# Patient Record
Sex: FEMALE | Race: WHITE
[De-identification: names, ages, dates, MRNs, and addresses within clinical notes are randomized per-mention and may not be internally consistent; named-entity substitution may affect disease eponyms.]

---

## 2017-01-11 ENCOUNTER — HOSPITAL ENCOUNTER (OUTPATIENT)
Dept: HOSPITAL 53 - M SFHCADAM | Age: 52
End: 2017-01-11
Attending: PHYSICIAN ASSISTANT
Payer: COMMERCIAL

## 2017-01-11 DIAGNOSIS — G89.29: ICD-10-CM

## 2017-01-11 DIAGNOSIS — E55.9: ICD-10-CM

## 2017-01-11 DIAGNOSIS — F17.210: Primary | ICD-10-CM

## 2017-01-11 LAB
ALBUMIN SERPL BCG-MCNC: 4.7 GM/DL (ref 3.2–5.2)
ALBUMIN/GLOB SERPL: 1.68 {RATIO} (ref 1–1.93)
ALP SERPL-CCNC: 99 U/L (ref 45–117)
ALT SERPL W P-5'-P-CCNC: 21 U/L (ref 12–78)
ANION GAP SERPL CALC-SCNC: 7 MEQ/L (ref 8–16)
AST SERPL-CCNC: 18 U/L (ref 15–37)
BASOPHILS # BLD AUTO: 0.1 K/MM3 (ref 0–0.2)
BASOPHILS NFR BLD AUTO: 1 % (ref 0–1)
BILIRUB SERPL-MCNC: 0.3 MG/DL (ref 0.2–1)
BUN SERPL-MCNC: 10 MG/DL (ref 7–18)
CALCIUM SERPL-MCNC: 9.1 MG/DL (ref 8.5–10.1)
CHLORIDE SERPL-SCNC: 101 MEQ/L (ref 98–107)
CHOLEST SERPL-MCNC: 286 MG/DL (ref ?–200)
CO2 SERPL-SCNC: 33 MEQ/L (ref 21–32)
CREAT SERPL-MCNC: 0.84 MG/DL (ref 0.55–1.02)
EOSINOPHIL # BLD AUTO: 0.5 K/MM3 (ref 0–0.5)
EOSINOPHIL NFR BLD AUTO: 4.6 % (ref 0–3)
ERYTHROCYTE [DISTWIDTH] IN BLOOD BY AUTOMATED COUNT: 12.5 % (ref 11.5–14.5)
EST. AVERAGE GLUCOSE BLD GHB EST-MCNC: 120 MG/DL (ref 60–110)
GFR SERPL CREATININE-BSD FRML MDRD: > 60 ML/MIN/{1.73_M2} (ref 51–?)
GLUCOSE SERPL-MCNC: 118 MG/DL (ref 70–105)
LARGE UNSTAINED CELL #: 0.3 K/MM3 (ref 0–0.4)
LARGE UNSTAINED CELL %: 3.2 % (ref 0–4)
LYMPHOCYTES # BLD AUTO: 5.4 K/MM3 (ref 1.5–4.5)
LYMPHOCYTES NFR BLD AUTO: 52.4 % (ref 24–44)
MCH RBC QN AUTO: 30.2 PG (ref 27–33)
MCHC RBC AUTO-ENTMCNC: 33.3 G/DL (ref 32–36.5)
MCV RBC AUTO: 90.6 FL (ref 80–96)
MONOCYTES # BLD AUTO: 0.6 K/MM3 (ref 0–0.8)
MONOCYTES NFR BLD AUTO: 5.8 % (ref 0–5)
NEUTROPHILS # BLD AUTO: 3.4 K/MM3 (ref 1.8–7.7)
NEUTROPHILS NFR BLD AUTO: 33 % (ref 36–66)
PLATELET # BLD AUTO: 271 K/MM3 (ref 150–450)
POTASSIUM SERPL-SCNC: 4 MEQ/L (ref 3.5–5.1)
PROT SERPL-MCNC: 7.5 GM/DL (ref 6.4–8.2)
SODIUM SERPL-SCNC: 141 MEQ/L (ref 136–145)
TRIGL SERPL-MCNC: 275 MG/DL (ref ?–150)
WBC # BLD AUTO: 10.3 K/MM3 (ref 4–10)

## 2017-01-12 ENCOUNTER — HOSPITAL ENCOUNTER (OUTPATIENT)
Dept: HOSPITAL 53 - M SFHCADAM | Age: 52
End: 2017-01-12
Attending: PHYSICIAN ASSISTANT
Payer: COMMERCIAL

## 2017-01-12 DIAGNOSIS — G89.29: Primary | ICD-10-CM

## 2017-01-12 DIAGNOSIS — R79.89: ICD-10-CM

## 2017-01-12 LAB — T4 FREE SERPL-MCNC: 1.22 NG/DL (ref 0.76–1.46)

## 2017-01-23 ENCOUNTER — HOSPITAL ENCOUNTER (OUTPATIENT)
Dept: HOSPITAL 53 - M ADAMS | Age: 52
End: 2017-01-23
Attending: PHYSICIAN ASSISTANT
Payer: COMMERCIAL

## 2017-01-23 DIAGNOSIS — M54.5: ICD-10-CM

## 2017-01-23 DIAGNOSIS — M54.2: Primary | ICD-10-CM

## 2017-01-23 DIAGNOSIS — G89.29: ICD-10-CM

## 2017-01-24 NOTE — REP
Clinical:  Cervicalgia a

 

Technique:  AP, lateral, flexion/extension, bilateral oblique, and open-mouth

views.

 

Findings:

Alignment and lordosis maintained.  No acute fracture / compression injury or

subluxation.  Minimal age-related changes include subtle anterior spurring at the

C6-7 and to a lesser extent the C5-6 levels as well as very minimal disc space

narrowing at the C7-T1 level.  Spinous processes are intact and normal.

Prevertebral soft tissues are normal.  Oblique views demonstrate patent neural

foramen.  Open mouth view demonstrates normal C1-C2 articulation and odontoid

process.

 

Impression:

Mild age-related degenerative changes.

 

 

Signed by

Yousuf King MD 01/24/2017 04:59 A

## 2017-01-24 NOTE — REP
Clinical:  Pain .

 

Technique:  AP, lateral views of the left knee.

 

Findings:  The osseous structures and joint spaces are intact and normal.  There

is no evidence for acute fracture or dislocation.  No joint effusion is

appreciated.  Surrounding soft tissues are unremarkable.  No subcutaneous

emphysema or radiodense foreign body.  No overt arthritic degenerative changes

appreciated.

 

Impression:

Normal age-appropriate left knee radiographs.

 

 

Signed by

Yousuf King MD 01/24/2017 05:01 A

## 2017-01-24 NOTE — REP
Clinical:  Lower back pain.

 

Technique:  AP, lateral, bilateral oblique and coned-down views of the

lumbosacral spine.

 

Findings:

Alignment and lordosis maintained.  No acute fracture / compression injury or

subluxation.  Mild osteopenia is suggested along with mild multilevel

degenerative changes including disc space narrowing at the L5-S1 level as well as

lower thoracic and T12-L1 levels with minimal anterior spurring.  No

spondylolysis or spondylolisthesis appreciated.

 

Impression:

Mild osteopenia and mild degenerative changes as detailed above.

 

 

Signed by

Yousuf King MD 01/24/2017 04:57 A

## 2017-02-20 ENCOUNTER — HOSPITAL ENCOUNTER (OUTPATIENT)
Dept: HOSPITAL 53 - M PAIN | Age: 52
End: 2017-02-20
Attending: NURSE PRACTITIONER
Payer: COMMERCIAL

## 2017-02-20 DIAGNOSIS — M54.2: ICD-10-CM

## 2017-02-20 DIAGNOSIS — F17.210: ICD-10-CM

## 2017-02-20 DIAGNOSIS — M54.5: ICD-10-CM

## 2017-02-20 DIAGNOSIS — Z79.891: ICD-10-CM

## 2017-02-20 DIAGNOSIS — G43.909: ICD-10-CM

## 2017-02-20 DIAGNOSIS — M15.0: ICD-10-CM

## 2017-02-20 DIAGNOSIS — M25.511: ICD-10-CM

## 2017-02-20 DIAGNOSIS — Z79.899: ICD-10-CM

## 2017-02-20 DIAGNOSIS — G89.29: ICD-10-CM

## 2017-02-20 DIAGNOSIS — M51.26: ICD-10-CM

## 2017-02-20 DIAGNOSIS — R07.89: ICD-10-CM

## 2017-02-20 DIAGNOSIS — Z79.1: ICD-10-CM

## 2017-02-20 DIAGNOSIS — Z86.73: ICD-10-CM

## 2017-02-20 DIAGNOSIS — M25.562: ICD-10-CM

## 2017-02-20 DIAGNOSIS — Z86.718: ICD-10-CM

## 2017-02-20 DIAGNOSIS — E78.5: ICD-10-CM

## 2017-02-20 DIAGNOSIS — Z09: Primary | ICD-10-CM

## 2017-02-20 DIAGNOSIS — H18.50: ICD-10-CM

## 2017-02-24 NOTE — ECWPNPC
PATIENT NAME: GE WATKINS

: 1965

GENDER: FEMALE

MRN: [id number]

VISIT DATE: 2017

DISCHARGE DATE: 17 1132

VISIT LOCKED DATE TIME: 

PHYSICIAN: MARCELINO BAH 

RESOURCE: MARCELINO BAH 

 

           

           

REASON FOR APPOINTMENT

           

          1. CHRONIC PAIN RECONSULT

           

HISTORY OF PRESENT ILLNESS

           

      FALL RISK SCREENING:

      SCREENING

           

           

          :NO FALLS IN THE PAST YEAR

           

      PAIN SCREENING:

      PATIENT HAS A COMPLAINT OF ACUTE OR CHRONIC PAIN

           

           

          YES

           

      TODAY'S VISIT:

      NOTES:

          HAD BEEN SEEING PCP WHOM HAD BEEN PRESCRIBING HER PAIN MEDS -VWAS

          DECREASED IN DOSAGE AND WAS PUT ON WEEKLY MEDS..REPORTS PAIN AS

          7/10. NOTES PAIN IS CENTERED OVER RIGHT SHOULDER, LOW BACK AND

          LEFT KNEE. LEVEL OF PAIN DEPENDS ON ACTIVITY. STATES SHE IS

          HAVING HEART PROBLEMS - HAS BEEN TO THE ER 3 TIMES. USES NTG,

          BABY ASPIRIN TO TREAT CHEST PAIN BUT HAS NOT YET SEEN A

          CARDIOLOGIST. RATES PAIN LEVEL TODAY AS 7/10. DESCRIBES IT AS

          CONSTANT ACHING SHARP STABBING AND SORE..

           

CURRENT MEDICATIONS

           

          TAKING NAPROXEN 500 MG TABLET 1 TABLET AS NEEDED ORALLY EVERY 12

          HRS

          TAKING VITAMIN D (ERGOCALCIFEROL) 43171 UNIT CAPSULE 1 CAPSULE

          ORALLY ONCE A WEEK

          TAKING CYCLOBENZAPRINE HCL 5 MG TABLET 1 TABLET ORALLY THREE

          TIMES A DAY AS NEEDED

          TAKING OXYCODONE-ACETAMINOPHEN 7.5-325 MG TABLET 1 TABLET AS

          NEEDED ORALLY EVERY 8 HRS, MDD 3

          NOT-TAKING FISH OIL 1000 MG CAPSULE 2 CAPSULE ORALLY TWICE DAILY

          MEDICATION LIST REVIEWED AND RECONCILED WITH THE PATIENT

           

PAST MEDICAL HISTORY

           

          CHRONIC NECK AND LOW BACK PAIN REQUIRING LONG TERM OPIATE

          MEDICATIONS - PREVIOUSLY FOLLOWED BY [facility] PAIN CLINIC - NO

          CONCERNS, DUE TO APPOINTMENT TIME CONFLICTS MED MANAGEMENT WAS

          DEFERRED BACK TO PCP

          HYPERLIPIDEMIA

          CORNEAL DYSTROPHY - HAS ROUTINE EYE EXAMS WITH CENTER FOR SIGHT

          OSTEOARTHRITS

          MIGRAINES

          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

          UNSTABLE LEFT KNEE

          TORN ROTATOR CUFF RIGHT

          CHEST PAIN AND PRESSURE

           

ALLERGIES

           

          N.K.D.A.

           

SURGICAL HISTORY

           

          EXPLORATORY LAP

          C SECTION X 2

          CHOLECYSTECTOMY DR. BOOKERH - [facility] 14

          COLPOSCOPY

           

FAMILY HISTORY

           

          FATHER: ALIVE

          MOTHER:  67 YRS

          MATERNAL GRAND FATHER: 

          3DAUGHTER(S) .

          BROTHER  OF [place] BUT NOT H[place] LYMP.3 DAUGHTERS -1 HAD BONE

          [place] , NON HODGKINS LYMPHOBLASTIC LYMPHOMA B CELL IN THE BONES, HAS

          ANOTHER CANCER BUT NO DIAGNOSIS AS OF YET. ONE TWIN IS HAVING

          SAME BONE ISSUES AND THE DAUGHTER WITH [place] AND WILL BE HAVING A

          WORK UP THURS.

           

SOCIAL HISTORY

           

          GENERAL:

           

          TOBACCO USE

          ARE YOU A:CURRENT SMOKER

          HOW MANY CIGARETTES A DAY DO YOU SMOKE?5 OR LESS

          HOW SOON AFTER YOU WAKE UP DO YOU SMOKE YOUR FIRST

          CIGARETTE?31-60 MIN

          HOW OFTEN DO YOU SMOKE CIGARETTES?SOME DAYS, BUT NOT EVERY DAY

          PATIENT COUNSELED ON THE DANGERS OF TOBACCO USE AND URGED TO

          QUIT:2017

          ARE YOU INTERESTED IN QUITTING?THINKING ABOUT QUITTING

          COUNSELED THE PATIENT ON SMOKING CESSATION, EDUCATION

          [id number]/20/2017

          ADDITIONAL FINDINGS: TOBACCO USERCHEWS FINE CUT TOBACCO DENIES

          EVERY DOING THIS.

          SMOKING CESSATION INFORMATION [id number]

           

           

          ALCOHOL SCREENING POINTS: 1, INTERPRETATION: NEGATIVE.

           

           

          RECREATIONAL DRUG USE

          DRUG USE?NO

           

           

          CAFFEINE

          CAFFEINE USE?YES

          HOW OFTEN AND HOW MUCH? 5-6 CUPS COFFEE DAILY

           

           

          SEXUAL HX HAD SEX IN THE LAST 12 MONTHS (VAGINAL, ORAL, OR

          ANAL)?: YES, WITH: MEN ONLY, USE PROTECTION?: NO, HAVE YOU EVER

          HAD AN STD?: NO, LMP:: 2014.

           

           

          OCCUPATION: IN HOUSE .

           

           

          DIET: LOW FAT, LOW CHOLESTEROL.

           

           

          EXERCISE: NO REGULAR EXERCISE - STAYS ACTIVE.

           

           

          Bahai: NO Spiritism BELIEFS THAT WOULD IMPACT HEALTH CARE.

           

           

          LANGUAGE: ENGLISH.

           

           

          EDUCATION: COLLEGE. PLAN OF CARE FOR THE PAIN CLINIC REVIEWED

          WITH PATIENT AND SHE VERBALIZED UNDERSTANDING.

           

           

          LEARNING BARRIERS / SPECIAL NEEDS

          CHANGE FROM LAST VISIT?NO

          BARRIERS TO LEARNING?NO

          HEARING IMPAIRED?NO

          VISION IMPAIRED?NO

          COGNITIVELY IMPAIRED?NO

          READINESS TO LEARN?YES

          LEARNING PREFERENCES?NO

          LEARNING CAPABILITIES PRESENT?YES

          EMOTIONAL BARRIERS?NO

           

           

          PAIN CLINIC PFS, CLERGY, PUBLIC HEALTH REFERRALS

          PFS REFERRAL NEEDED?NO

          CLERGY REFERRAL NEEDED?NO

          PUBLIC HEALTH REFERRAL NEEDED?NO

           

           

          ADVANCED DIRECTIVES

          HEALTH CARE PROXY?NO

          POWER OF ?NO

           

           

          : YES.

           

           

          DOMESTIC VIOLENCE: NONE.

           

HOSPITALIZATION/MAJOR DIAGNOSTIC PROCEDURE

           

          SURGERY RELATED ONLY

           

REVIEW OF SYSTEMS

           

      CONSTITUTIONAL:

           

          ANY CHANGE IN YOUR MEDICAL CONDITION? YES CHEST PAIN AND PRESSURE

          THAT SHE IS BEING WORKED UP FOR . CHILLS NO . FEVER NO .

           

      INFECTION:

           

          DO YOU HAVE NEW INFECTIONS? NO . DO YOU HAVE HISTORY OF MRSA? NO

          .

           

      MUSCULOSKELETAL:

           

          ANY NEW PATTERNS OF PAIN OR NUMBNESS? YES PAIN HAS INCREASED IN

          INTENSITY AND SHE IS UNDER A HUGE AMOUNT OF STRESS AT THIS TIME .

          SYTEMIC LUPUS NO .

           

      GASTROENTEROLOGY:

           

          ANY NEW CHANGE IN BOWEL CONTROL? NO . BARRETTS ESOPHAGUS NO .

          CIRRHOSIS NO . HEPATITIS NO . LIVER FAILURE NO . ACID REFLUX NO .

          UNEXPLAINED WEIGHT LOSS NO .

           

      GENITOURINARY:

           

          ANY NEW CHANGE IN BLADDER CONTROL? NO . IS THERE A CHANCE YOU

          COULD BE PREGNANT? NO .

           

      HEMATOLOGY/LYMPH:

           

          DO YOU TAKE ANY BLOOD THINNERS? (FOR EXAMPLE- COUMADIN, PLAVIX,

          AGGRENOX, PLATEL, PRADAXA, OR XARELTO) NO . WHEN WAS YOUR LAST

          DOSE? DATE: TIME: . LOW PLATELET COUNT NO . SICKLE CELL DISEASE

          NO . VON WILLIEBRANDS NO . FACTOR V LEIDEN NO . THALLASEMIA NO .

          ANEMIA NO . EASY BRUISING NO .

           

      NEUROLOGY:

           

          HAVE YOU FALLEN IN THE PAST 6 MONTHS? YES, SLIPPED ON ICE--NO

          FRACTURES . ANY NEW EXTREMITY NUMBNESS OR WEAKNESS? NO . HEAD

          INJURY NO . DEMENTIA NO . CEREBRAL PALSY NO . MULTIPLE SCLEROSIS

          NO . DIZZINESS NO . HEADACHE ADMITS, HISTORY IF MIGRAINES .

          STROKES NO . VERTIGO NO .

           

      CARDIOLOGY:

           

          DO YOU HAVE A PACEMAKER OR DEFIBRILLATOR? NO . ANGINA NO . HEART

          ATTACK NO . HEART SURGERY NO . CONGESTIVE HEART FAILURE/FLUID

          OVERLOAD NO . CHEST PAIN PATIENT ADMITS, LEFT-SIDED,

          MIDSTERNALPRESSURE-LIKE SENSATION, , RADIATING TO LEFT ARM,

          RANDOM, RELIEVED WITH SUBLINGUAL NTG, SHARP. BEING WORKED UP AT

          [facility] in [place]. SCHEDULED FOR AN ECHO ON THURS. . HIGH BLOOD

          PRESSURE NO . IRREGULAR HEART BEAT NO .

           

      RESPIRATORY:

           

          HAVE YOU BEEN SICK IN THE PAST WEEK? NO . FEVER NO . FLU LIKE

          SYMPTOMS? NO . CPAP NO . BYPAP NO . ASTHMA NO . EMPHYSEMA NO .

          CHRONIC LUNG DISEASES NO . SHORTNESS OF BREATH ON EXERTION NO .

          COUGH NO . SNORING NO .

           

      INTEGUMENTARY:

           

          DO YOU HAVE ANY RASHES OR OPEN SORES? NO .

           

      ALLERGIC/IMMUNO:

           

          ARE YOU ALLERGIC TO SHELLFISH OR IV DYE? NO . ANY NEW ALLERGIES?

          NO .

           

      PSYCHIATRIC:

           

          DO YOU HAVE THOUGHTS OF HURTING YOURSELF OR SOMEONE ELSE? NO .

          ARE YOU ABUSED, NEGLECTED, OR IN AN UNSAFE ENVIRONMENT? NO .

           

      ENDOCRINOLOGY:

           

          ARE YOU DIABETIC? NO . THYROID DISORDER NO .

           

      OTHER:

           

          DO YOU NEED ANY PRESCRIPTIONS? YES OXYCODONE / ACETOMIN.,

          CYCLOBENZAPRINE . IF YES, PLEASE LIST: ____ . ANY NEW PROBLEMS

          WITH YOUR MEDICATIONS? NO . WHEN DID YOU LAST EAT? ____ . WHEN

          DID YOU LAST DRINK? ____ . WHAT DID YOU LAST DRINK? ____ . NAME

          OF PERSON DRIVING YOU HOME? ____ . DO YOU HAVE ANY OTHER

          QUESTIONS OR CONCERNS YES, WOULD LIKE TO DISCUSS MEDICTION AND

          PAIN RELIEF .

           

      REVIEWED BY:

           

          PROVIDER: MARCELINO MART .

           

VITAL SIGNS

           

          .8 LBS, HT 64 IN, BMI 20.22 INDEX, /69 MM HG, HR 87

          /MIN, RR 16 /MIN, TEMP 98.4 F, OXYGEN SAT % 96%, NA INITIALS SC

          15:33, REVIEWED BY: AD.

           

EXAMINATION

           

      GENERAL EXAMINATION:

          PSYCHALERT , ORIENTED X 3 , ANXIOUS, GOOD EYE CONTACT.

           

          HEENT:NORMOCEPHALIC. NO LYMPHADENOPATHY. NO THYROMEGALY..

           

          LUNGS:CLEAR TO AUSCULTATION BILATERALLY. NO WHEEZES NO RALES NO

          RHONCHI.

           

          HEART:HEART RATE REGULAR, RAPID. NO CARDIAC BRUITS. NO MURMUR

          HEARD TODAY..

           

          MUSCULOSKELETAL:TENDER WITH PALPATION OVER LUMBOSACRAL AXIS.

          POINTS AND TIGHT FIBROUS BANDS ARE IDENTIFIED OVER LUMBAR

          PARAVERTEBRAL MUSCLES. RISES EASILY TO A STANDING POSITION.

          POSTURE UPRIGHT. GAIT IS WIDE-BASED VERY SLIGHT RIGHT LEG LIMP.

          POINT TENDERNESS OVER ANTERIOR AND POSTERIOR AC JOINT. RIGHT

          SIDE. ABLE TO ABDUCT RIGHT SHOULDER TO 90  . PAIN IS NOTED WITH

          INTERNAL AND EXTERNAL ROTATION. EQUAL AND STRONG. NO FOCAL

          WEAKNESS IDENTIFIED IN THE UPPER OR LOWER EXTREMITIES..

           

          JOINTS:LEFT KNEE TENDERNESS TO PALPATION. NO SIGNIFICANT EDEMA.

          NO WARMTH NOTED ON PALPATION.

           

          NEUROLOGIC EXAM:EOMS ARE INTACT WITHOUT NYSTAGMUS. CRANIAL NERVES

          II THROUGH XII ARE GROSSLY INTACT. SPEECH IS NON-DYSARTHRIC.

          RAPID REPEATING ARM MOVEMENTS NON-DYSMETRIC. NO FOCAL SENSORY

          DEFICIT..

           

ASSESSMENTS

           

          PRIMARY OSTEOARTHRITIS INVOLVING MULTIPLE JOINTS - M15.0

          (PRIMARY)

           

          OTHER CHRONIC PAIN - G89.29

           

          PAIN IN RIGHT SHOULDER - M25.511

           

          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

           

          CHRONIC PRESCRIPTION OPIATE USE - Z79.891

           

TREATMENT

           

      PRIMARY OSTEOARTHRITIS INVOLVING MULTIPLE JOINTS

          REFILL OXYCODONE-ACETAMINOPHEN TABLET, 7.5-325 MG, 1 TABLET AS

          NEEDED, ORALLY, EVERY 8 HRS, MDD 3, 15 DAYS, 45, REFILLS 0

          [facility] MRI SPINE, L.S. WITH [id number],SUSAN M 2017

          4:41:59 PM > INCREASED BACK PAIN

          NOTES: NARCOTIC AGREEMENT TODAY. UTOX TODAY.

          CLINICAL NOTES: ISTOP REGISTRY REVIEWED AND DEMNOSTRATES

          COMPLLIANCE. BRINGS IN MEDICATIONS WHICH IS APPROPRIATE FOR WHAT

          WAS DISPENSED. I DID ASK GE JUNG THE REASON FOR HER

          MEDICATION CHANGE AND BEING PUT ON A WEEKLY REFILL. SHE STATES

          THAT THERE WAS A PERSONALITY DIFFERENCE. I WILL FURTHER EXPLORE

          THIS AND I WILL PLAN TO SEE HER BACK ON WITHIN THE 2 WEEK PERIOD

          TO DISCUSS FURTHER OPTIONS.

           

PROCEDURE CODES

           

           ESTABILISHED PATIENT [facility] CHARGE

           

DISPOSITION & COMMUNICATION

           

FOLLOW UP

           

          2 WEEKS (REASON: SAUL TO GET IMAGING ON RIGHT SHOULDER AT [facility] -

          CHECK AUTH FOR MRI LUMBAR SPINESRACUSE)

           

 

ELECTRONICALLY SIGNED BY LAUREN HENNESSY ON

          2017 AT 11:37 AM EST

           

           

           

 

DISCLAIMER :

THIS IS A VISIT SUMMARY EXTRACTED FROM THE [employer] CHART.

IT IS NOT A COPY OF THE [employer] PROGRESS NOTE.

BAMBI

## 2017-03-23 ENCOUNTER — HOSPITAL ENCOUNTER (OUTPATIENT)
Dept: HOSPITAL 53 - M PAIN | Age: 52
End: 2017-03-23
Attending: NURSE PRACTITIONER
Payer: COMMERCIAL

## 2017-03-23 DIAGNOSIS — M15.0: ICD-10-CM

## 2017-03-23 DIAGNOSIS — E78.5: ICD-10-CM

## 2017-03-23 DIAGNOSIS — G89.29: ICD-10-CM

## 2017-03-23 DIAGNOSIS — Z79.891: ICD-10-CM

## 2017-03-23 DIAGNOSIS — M54.2: ICD-10-CM

## 2017-03-23 DIAGNOSIS — E55.9: ICD-10-CM

## 2017-03-23 DIAGNOSIS — F17.210: ICD-10-CM

## 2017-03-23 DIAGNOSIS — Z79.899: ICD-10-CM

## 2017-03-23 DIAGNOSIS — M54.5: Primary | ICD-10-CM

## 2017-03-24 NOTE — ECWPNPC
PATIENT NAME: GE WATKINS

: 1965

GENDER: FEMALE

MRN: [id number]

VISIT DATE: 2017

DISCHARGE DATE: 17 1615

VISIT LOCKED DATE TIME: 

PHYSICIAN: MARCELINO BAH 

RESOURCE: MARCELINO BAH 

 

           

           

REASON FOR APPOINTMENT

           

          1. BACK/SHOULDER

           

HISTORY OF PRESENT ILLNESS

           

      HISTORY OF PRESENT ILLNESS:

      PAIN

           

           

          THE PATIENT DESCRIBES THE PAIN...

           

      FALL RISK SCREENING:

      SCREENING

           

           

          :NO FALLS IN THE PAST YEAR

           

      TODAY'S VISIT:

      NOTES:

          WAS SCHEDULED TO ETURN IN 2 WEEKS AFTER HER LAST VISIT. WAS NOT

          ABLE TO KEEP THIS APPOINTMENT DUE TO HER LIFE SCHEDULE. HAS HAD

          NOOXYCODONE SINCE THIS. REPORTS SHE HAS BEEN USING NATURAL

          REMEDIES AND HER HORSE MEDICINES AND STATE HER PAIN IS MANAGED.

          REPORTS SHE IS HAVING GI DISCOMFORT FROM AN ANTI INFLAMMATORY.

          REPORTS SHE IS VERY UPSET WITH HAVING HAD HER MEDS DISRUPTED AS

          THIS WAS "JUST PERFECT"..

           

CURRENT MEDICATIONS

           

          TAKING NAPROXEN 500 MG TABLET 1 TABLET AS NEEDED ORALLY EVERY 12

          HRS

          TAKING VITAMIN D (ERGOCALCIFEROL) 44261 UNIT CAPSULE 1 CAPSULE

          ORALLY ONCE A WEEK

          TAKING CYCLOBENZAPRINE HCL 5 MG TABLET 1 TABLET ORALLY THREE

          TIMES A DAY AS NEEDED

          TAKING OXYCODONE-ACETAMINOPHEN 7.5-325 MG TABLET 1 TABLET AS

          NEEDED ORALLY EVERY 8 HRS, MDD 3

          NOT-TAKING FISH OIL 1000 MG CAPSULE 2 CAPSULE ORALLY TWICE DAILY

          MEDICATION LIST REVIEWED AND RECONCILED WITH THE PATIENT

           

PAST MEDICAL HISTORY

           

          CHRONIC NECK AND LOW BACK PAIN REQUIRING LONG TERM OPIATE

          MEDICATIONS - PREVIOUSLY FOLLOWED BY [facility] PAIN CLINIC - NO

          CONCERNS, DUE TO APPOINTMENT TIME CONFLICTS MED MANAGEMENT WAS

          DEFERRED BACK TO PCP

          HYPERLIPIDEMIA

          CORNEAL DYSTROPHY - HAS ROUTINE EYE EXAMS WITH [facility] S[facility]

          OSTEOARTHRITS

          MIGRAINES

          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

          UNSTABLE LEFT KNEE

          TORN ROTATOR CUFF RIGHT

          CHEST PAIN AND PRESSURE

           

ALLERGIES

           

          N.K.D.A.

           

SOCIAL HISTORY

           

          GENERAL:

           

          TOBACCO USE

          ARE YOU A:CURRENT SMOKER

          HOW MANY CIGARETTES A DAY DO YOU SMOKE?11-20

          HOW SOON AFTER YOU WAKE UP DO YOU SMOKE YOUR FIRST

          CIGARETTE?WITHIN 5 MIN

          HOW OFTEN DO YOU SMOKE CIGARETTES?EVERY DAY

          PATIENT COUNSELED ON THE DANGERS OF TOBACCO USE AND URGED TO

          QUIT:2017

          ARE YOU INTERESTED IN QUITTING?NOT READY TO QUIT

          COUNSELED THE PATIENT ON SMOKING EFFECTS, EDUCATION

          [id number]/23/2017

          ADDITIONAL FINDINGS: TOBACCO USERCHEWS FINE CUT TOBACCO DENIES

          EVERY DOING THIS.

          SMOKING CESSATION INFORMATION [id number]

           

           

          ALCOHOL SCREENING POINTS: 1, INTERPRETATION: NEGATIVE.

           

           

          RECREATIONAL DRUG USE

          DRUG USE?NO

           

           

          CAFFEINE

          CAFFEINE USE?YES

          HOW OFTEN AND HOW MUCH? 5-6 CUPS COFFEE DAILY

           

           

          SEXUAL HX HAD SEX IN THE LAST 12 MONTHS (VAGINAL, ORAL, OR

          ANAL)?: YES, WITH: MEN ONLY, USE PROTECTION?: NO, HAVE YOU EVER

          HAD AN STD?: NO, LMP:: 2014.

           

           

          OCCUPATION: IN HOUSE .

           

           

          DIET: LOW FAT, LOW CHOLESTEROL.

           

           

          EXERCISE: NO REGULAR EXERCISE - STAYS ACTIVE.

           

           

          Quaker: NO Pentecostalism BELIEFS THAT WOULD IMPACT HEALTH CARE.

           

           

          LANGUAGE: ENGLISH.

           

           

          EDUCATION: COLLEGE. PLAN OF CARE FOR THE PAIN CLINIC REVIEWED

          WITH PATIENT AND SHE VERBALIZED UNDERSTANDING.

           

           

          LEARNING BARRIERS / SPECIAL NEEDS

          CHANGE FROM LAST VISIT?NO

          BARRIERS TO LEARNING?NO

          HEARING IMPAIRED?NO

          VISION IMPAIRED?NO

          COGNITIVELY IMPAIRED?NO

          READINESS TO LEARN?YES

          LEARNING PREFERENCES?NO

          LEARNING CAPABILITIES PRESENT?YES

          EMOTIONAL BARRIERS?NO

           

           

          PAIN CLINIC PFS, CLERGY, PUBLIC HEALTH REFERRALS

          PFS REFERRAL NEEDED?NO

          CLERGY REFERRAL NEEDED?NO

          PUBLIC HEALTH REFERRAL NEEDED?NO

           

           

          ADVANCED DIRECTIVES

          HEALTH CARE PROXY?NO

          POWER OF ?NO

           

           

          : YES.

           

           

          DOMESTIC VIOLENCE: NONE.

           

REVIEW OF SYSTEMS

           

      CONSTITUTIONAL:

           

          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 . ANY CHANGE IN YOUR MEDICAL CONDITION? NO

          . CHILLS NO . FEVER NO .

           

      INFECTION:

           

          DO YOU HAVE NEW INFECTIONS? NO . DO YOU HAVE HISTORY OF MRSA? NO

          .

           

      MUSCULOSKELETAL:

           

          ANY NEW PATTERNS OF PAIN OR NUMBNESS? NO .

           

      GASTROENTEROLOGY:

           

          GENERAL DENIES HEMATEMESIS OR HEMATACHEZIA . ANY NEW CHANGE IN

          BOWEL CONTROL? NO .

           

      GENITOURINARY:

           

          ANY NEW CHANGE IN BLADDER CONTROL? NO . IS THERE A CHANCE YOU

          COULD BE PREGNANT? NO .

           

      HEMATOLOGY/LYMPH:

           

          DO YOU TAKE ANY BLOOD THINNERS? (FOR EXAMPLE- COUMADIN, PLAVIX,

          AGGRENOX, PLATEL, PRADAXA, OR XARELTO) NO . WHEN WAS YOUR LAST

          DOSE? DATE: TIME: .

           

      NEUROLOGY:

           

          HAVE YOU FALLEN IN THE PAST 6 MONTHS? YES, OVER THE WINTER HER

          LEFT KNEE GAVE OUT. NO INJURY . ANY NEW EXTREMITY NUMBNESS OR

          WEAKNESS? NO .

           

      CARDIOLOGY:

           

          DO YOU HAVE A PACEMAKER OR DEFIBRILLATOR? NO .

           

      RESPIRATORY:

           

          HAVE YOU BEEN SICK IN THE PAST WEEK? NO . FEVER NO . FLU LIKE

          SYMPTOMS? NO . COUGH NO .

           

      INTEGUMENTARY:

           

          DO YOU HAVE ANY RASHES OR OPEN SORES? NO .

           

      ALLERGIC/IMMUNO:

           

          ARE YOU ALLERGIC TO SHELLFISH OR IV DYE? NO . ANY NEW ALLERGIES?

          NO .

           

      PSYCHIATRIC:

           

          DO YOU HAVE THOUGHTS OF HURTING YOURSELF OR SOMEONE ELSE? NO .

          ARE YOU ABUSED, NEGLECTED, OR IN AN UNSAFE ENVIRONMENT? NO .

           

      ENDOCRINOLOGY:

           

          ARE YOU DIABETIC? NO .

           

      OTHER:

           

          DO YOU NEED ANY PRESCRIPTIONS? YES . IF YES, PLEASE LIST:

          ____PERCOCET . ANY NEW PROBLEMS WITH YOUR MEDICATIONS? NO . WHEN

          DID YOU LAST EAT? ____ . WHEN DID YOU LAST DRINK? ____ . WHAT DID

          YOU LAST DRINK? ____ . NAME OF PERSON DRIVING YOU HOME? ____ . DO

          YOU HAVE ANY OTHER QUESTIONS OR CONCERNS NO .

           

      REVIEWED BY:

           

          PROVIDER: MARCELINO MART .

           

VITAL SIGNS

           

          .2 LBS, HT 64 IN, BMI 20.80 INDEX, /72 MM HG, HR 86

          /MIN, RR 16 /MIN, TEMP 96.5 F, OXYGEN SAT % 98%, NA INITIALS SC15

          :51, REVIEWED BY: AD.

           

EXAMINATION

           

      GENERAL EXAMINATION:

          PSYCHALERT , ORIENTED X 3 , PRESSURED SPEECH, FRUSTRATED.

           

          MUSCULOSKELETAL:DECREASED ROM OF RIGHT SHOULDER. POSTURE UPRIGHT.

          GAIT NONANTALGIC.

           

ASSESSMENTS

           

          PRIMARY OSTEOARTHRITIS INVOLVING MULTIPLE JOINTS - M15.0

           

TREATMENT

           

      OTHERS

          NOTES: USE NATURAL PAIN RELIEVING REMEDIES. RECOMMEND NO OPIOIDS.

           

PROCEDURE CODES

           

           ESTABILISHED PATIENT [facility] CHARGE

           

DISPOSITION & COMMUNICATION

           

FOLLOW UP

           

          CALL IF APPOINTMENT NEEDED

           

 

ELECTRONICALLY SIGNED BY LAUREN HENNESSY ON

          2017 AT 04:29 PM EDT

           

           

           

 

DISCLAIMER :

THIS IS A VISIT SUMMARY EXTRACTED FROM THE [employer] CHART.

IT IS NOT A COPY OF THE [employer] PROGRESS NOTE.

DAVIDD

## 2018-10-31 ENCOUNTER — HOSPITAL ENCOUNTER (OUTPATIENT)
Dept: HOSPITAL 53 - M LAB REF | Age: 53
End: 2018-10-31
Attending: NURSE PRACTITIONER
Payer: COMMERCIAL

## 2018-10-31 DIAGNOSIS — Z13.9: Primary | ICD-10-CM

## 2018-10-31 LAB
25(OH)D3 SERPL-MCNC: 27.6 NG/ML (ref 30–100)
ALBUMIN/GLOBULIN RATIO: 1.3 (ref 1–1.93)
ALBUMIN: 4.3 GM/DL (ref 3.2–5.2)
ALKALINE PHOSPHATASE: 84 U/L (ref 45–117)
ALT SERPL W P-5'-P-CCNC: 27 U/L (ref 12–78)
ANION GAP: 5 MEQ/L (ref 8–16)
AST SERPL-CCNC: 20 U/L (ref 7–37)
BASO #: 0.1 10^3/UL (ref 0–0.2)
BASO %: 0.7 % (ref 0–1)
BILIRUBIN,TOTAL: 0.4 MG/DL (ref 0.2–1)
BLOOD UREA NITROGEN: 12 MG/DL (ref 7–18)
CALCIUM LEVEL: 9.5 MG/DL (ref 8.5–10.1)
CARBON DIOXIDE LEVEL: 31 MEQ/L (ref 21–32)
CHLORIDE LEVEL: 103 MEQ/L (ref 98–107)
CHOLEST SERPL-MCNC: 321 MG/DL (ref ?–200)
CHOLESTEROL RISK RATIO: 7.64 (ref ?–5)
CREATININE FOR GFR: 0.74 MG/DL (ref 0.55–1.3)
EOS #: 0.4 10^3/UL (ref 0–0.5)
EOSINOPHIL NFR BLD AUTO: 4.5 % (ref 0–3)
EST. AVERAGE GLUCOSE BLD GHB EST-MCNC: 117 MG/DL (ref 60–110)
GFR SERPL CREATININE-BSD FRML MDRD: > 60 ML/MIN/{1.73_M2} (ref 51–?)
GLUCOSE, FASTING: 90 MG/DL (ref 70–100)
HDLC SERPL-MCNC: 42 MG/DL (ref 40–?)
HEMATOCRIT: 44.4 % (ref 36–47)
HEMOGLOBIN: 14.5 G/DL (ref 12–15.5)
IMMATURE GRANULOCYTE %: 0.2 % (ref 0–3)
LDL CHOLESTEROL: 237 MG/DL (ref ?–100)
LYMPH #: 3.5 10^3/UL (ref 1.5–4.5)
LYMPH %: 39.3 % (ref 24–44)
MEAN CORPUSCULAR HEMOGLOBIN: 29.8 PG (ref 27–33)
MEAN CORPUSCULAR HGB CONC: 32.7 G/DL (ref 32–36.5)
MEAN CORPUSCULAR VOLUME: 91.2 FL (ref 80–96)
MONO #: 0.6 10^3/UL (ref 0–0.8)
MONO %: 6.4 % (ref 0–5)
NEUTROPHILS #: 4.3 10^3/UL (ref 1.8–7.7)
NEUTROPHILS %: 48.9 % (ref 36–66)
NONHDLC SERPL-MCNC: 279 MG/DL
NRBC BLD AUTO-RTO: 0 % (ref 0–0)
PLATELET COUNT, AUTOMATED: 256 10^3/UL (ref 150–450)
POTASSIUM SERUM: 4.2 MEQ/L (ref 3.5–5.1)
RED BLOOD COUNT: 4.87 10^6/UL (ref 4–5.4)
RED CELL DISTRIBUTION WIDTH: 13.3 % (ref 11.5–14.5)
SODIUM LEVEL: 139 MEQ/L (ref 136–145)
THYROID STIMULATING HORMONE: 1.65 UIU/ML (ref 0.36–3.74)
TOTAL PROTEIN: 7.6 GM/DL (ref 6.4–8.2)
TRIGLYCERIDES LEVEL: 209 MG/DL (ref ?–150)
VITAMIN B12 LEVEL: 1030 PG/ML (ref 247–911)
WHITE BLOOD COUNT: 8.9 10^3/UL (ref 4–10)

## 2020-03-24 ENCOUNTER — HOSPITAL ENCOUNTER (OUTPATIENT)
Dept: HOSPITAL 53 - M WUC | Age: 55
End: 2020-03-24
Attending: NURSE PRACTITIONER
Payer: COMMERCIAL

## 2020-03-24 DIAGNOSIS — M79.10: Primary | ICD-10-CM

## 2020-03-24 LAB
ALBUMIN SERPL BCG-MCNC: 4.3 GM/DL (ref 3.2–5.2)
ALT SERPL W P-5'-P-CCNC: 29 U/L (ref 12–78)
BASOPHILS # BLD AUTO: 0.1 10^3/UL (ref 0–0.2)
BASOPHILS NFR BLD AUTO: 0.7 % (ref 0–1)
BILIRUB SERPL-MCNC: 0.3 MG/DL (ref 0.2–1)
BUN SERPL-MCNC: 13 MG/DL (ref 7–18)
CALCIUM SERPL-MCNC: 9.3 MG/DL (ref 8.5–10.1)
CHLORIDE SERPL-SCNC: 103 MEQ/L (ref 98–107)
CO2 SERPL-SCNC: 31 MEQ/L (ref 21–32)
CREAT SERPL-MCNC: 0.71 MG/DL (ref 0.55–1.3)
CRP SERPL-MCNC: 0.48 MG/DL (ref 0–0.3)
EOSINOPHIL # BLD AUTO: 0.5 10^3/UL (ref 0–0.5)
EOSINOPHIL NFR BLD AUTO: 4.7 % (ref 0–3)
ERYTHROCYTE [SEDIMENTATION RATE] IN BLOOD BY WESTERGREN METHOD: 18 MM/HR (ref 0–30)
GFR SERPL CREATININE-BSD FRML MDRD: > 60 ML/MIN/{1.73_M2} (ref 51–?)
GLUCOSE SERPL-MCNC: 85 MG/DL (ref 70–100)
HCT VFR BLD AUTO: 46.1 % (ref 36–47)
HGB BLD-MCNC: 15.2 G/DL (ref 12–15.5)
LYMPHOCYTES # BLD AUTO: 4.6 10^3/UL (ref 1.5–5)
LYMPHOCYTES NFR BLD AUTO: 48.3 % (ref 24–44)
MCH RBC QN AUTO: 30.6 PG (ref 27–33)
MCHC RBC AUTO-ENTMCNC: 33 G/DL (ref 32–36.5)
MCV RBC AUTO: 92.9 FL (ref 80–96)
MONOCYTES # BLD AUTO: 0.7 10^3/UL (ref 0–0.8)
MONOCYTES NFR BLD AUTO: 7.3 % (ref 0–5)
NEUTROPHILS # BLD AUTO: 3.7 10^3/UL (ref 1.5–8.5)
NEUTROPHILS NFR BLD AUTO: 38.8 % (ref 36–66)
PLATELET # BLD AUTO: 271 10^3/UL (ref 150–450)
POTASSIUM SERPL-SCNC: 4.3 MEQ/L (ref 3.5–5.1)
PROT SERPL-MCNC: 7.8 GM/DL (ref 6.4–8.2)
RBC # BLD AUTO: 4.96 10^6/UL (ref 4–5.4)
SODIUM SERPL-SCNC: 137 MEQ/L (ref 136–145)
TSH SERPL DL<=0.005 MIU/L-ACNC: 1.71 UIU/ML (ref 0.36–3.74)
WBC # BLD AUTO: 9.6 10^3/UL (ref 4–10)

## 2020-03-27 LAB
B BURGDOR IGG+IGM SER-ACNC: <0.91 ISR (ref 0–0.9)
B BURGDOR IGM SER IA-ACNC: <0.8 INDEX (ref 0–0.79)
EBV NA IGG SER-ACNC: 270 U/ML (ref 0–17.9)

## 2020-04-08 ENCOUNTER — HOSPITAL ENCOUNTER (OUTPATIENT)
Dept: HOSPITAL 53 - M LABNEURO | Age: 55
End: 2020-04-08
Attending: PSYCHIATRY & NEUROLOGY
Payer: COMMERCIAL

## 2020-04-08 DIAGNOSIS — E11.9: ICD-10-CM

## 2020-04-08 DIAGNOSIS — M79.10: ICD-10-CM

## 2020-04-08 DIAGNOSIS — R41.0: ICD-10-CM

## 2020-04-08 DIAGNOSIS — E07.9: Primary | ICD-10-CM

## 2020-04-08 DIAGNOSIS — R20.0: ICD-10-CM

## 2020-04-08 DIAGNOSIS — M25.50: ICD-10-CM

## 2020-04-08 LAB
CK SERPL-CCNC: 85 U/L (ref 26–192)
EST. AVERAGE GLUCOSE BLD GHB EST-MCNC: 134 MG/DL (ref 60–110)
FOLATE SERPL-MCNC: 14.3 NG/ML
PROT SERPL-MCNC: 7.2 GM/DL (ref 6.4–8.2)
RHEUMATOID FACT SERPL-ACNC: < 10 IU/ML (ref ?–15)
VIT B12 SERPL-MCNC: 507 PG/ML

## 2020-04-10 LAB — ALDOLASE SERPL-CCNC: 9.8 U/L (ref 3.3–10.3)

## 2020-04-13 ENCOUNTER — HOSPITAL ENCOUNTER (EMERGENCY)
Dept: HOSPITAL 53 - M ED | Age: 55
Discharge: LEFT BEFORE DISCHARGE | End: 2020-04-13
Payer: COMMERCIAL

## 2020-04-13 VITALS — DIASTOLIC BLOOD PRESSURE: 86 MMHG | SYSTOLIC BLOOD PRESSURE: 137 MMHG

## 2020-04-13 DIAGNOSIS — I67.1: ICD-10-CM

## 2020-04-13 DIAGNOSIS — Z79.899: ICD-10-CM

## 2020-04-13 DIAGNOSIS — M54.5: ICD-10-CM

## 2020-04-13 DIAGNOSIS — R10.13: Primary | ICD-10-CM

## 2020-04-13 DIAGNOSIS — E78.5: ICD-10-CM

## 2020-04-13 DIAGNOSIS — I10: ICD-10-CM

## 2020-04-13 LAB
BASOPHILS NFR BLD MANUAL: 1 % (ref 0–1)
BUN SERPL-MCNC: 12 MG/DL (ref 7–18)
CALCIUM SERPL-MCNC: 9.5 MG/DL (ref 8.5–10.1)
CHLORIDE SERPL-SCNC: 105 MEQ/L (ref 98–107)
CK MB CFR.DF SERPL CALC: 1.69
CK MB SERPL-MCNC: 1.1 NG/ML (ref ?–3.6)
CK SERPL-CCNC: 65 U/L (ref 26–192)
CO2 SERPL-SCNC: 33 MEQ/L (ref 21–32)
CREAT SERPL-MCNC: 0.77 MG/DL (ref 0.55–1.3)
EOSINOPHIL NFR BLD MANUAL: 3 % (ref 0–3)
GFR SERPL CREATININE-BSD FRML MDRD: > 60 ML/MIN/{1.73_M2} (ref 51–?)
GLUCOSE SERPL-MCNC: 98 MG/DL (ref 70–100)
HCT VFR BLD AUTO: 44.8 % (ref 36–47)
HGB BLD-MCNC: 15 G/DL (ref 12–15.5)
LYMPHOCYTES NFR BLD MANUAL: 52 % (ref 16–44)
MCH RBC QN AUTO: 30.8 PG (ref 27–33)
MCHC RBC AUTO-ENTMCNC: 33.5 G/DL (ref 32–36.5)
MCV RBC AUTO: 92 FL (ref 80–96)
MONOCYTES NFR BLD MANUAL: 3 % (ref 0–5)
NEUTROPHILS NFR BLD MANUAL: 41 % (ref 28–66)
PLATELET # BLD AUTO: 279 10^3/UL (ref 150–450)
PLATELET BLD QL SMEAR: NORMAL
POTASSIUM SERPL-SCNC: 4.1 MEQ/L (ref 3.5–5.1)
RBC # BLD AUTO: 4.87 10^6/UL (ref 4–5.4)
SODIUM SERPL-SCNC: 140 MEQ/L (ref 136–145)
TROPONIN I SERPL-MCNC: < 0.02 NG/ML (ref ?–0.1)
WBC # BLD AUTO: 9.4 10^3/UL (ref 4–10)

## 2020-04-13 NOTE — REP
CHEST, SINGLE VIEW:

 

There is no evidence of acute infiltrate.

 

No pleural effusion is seen.

 

The heart is normal in size.

 

The mediastinal silhouette is unremarkable.

 

The visualized osseous structures are intact.

 

IMPRESSION:

 

No acute pulmonary disease.

 

 

Electronically Signed by

Abhinav Rehman MD 04/13/2020 07:52 P

## 2020-04-14 NOTE — ECGEPIP
[facility] - ED

                                       

                                       Test Date:    2020

Pat Name:     GE WATKINS          Department:   

Patient ID:   [id number]                 Room:         -

Gender:       Female                   Technician:   JOSE

:          1965               Requested By: JENNIFER MARTINS

Order Number: [id number]     Reading MD:   Mariajose Pulliam

                                 Measurements

Intervals                              Axis          

Rate:         62                       P:            -80

AL:           208                      QRS:          78

QRSD:         84                       T:            75

QT:           399                                    

QTc:          405                                    

                           Interpretive Statements

SINUS RHYTHM

ABNORMAL RHYTHM ECG

Electronically Signed on 2020 9:13:30 EDT by Mariajose Pulliam

## 2021-09-02 ENCOUNTER — HOSPITAL ENCOUNTER (OUTPATIENT)
Dept: HOSPITAL 53 - M LAB REF | Age: 56
End: 2021-09-02
Attending: PHYSICIAN ASSISTANT
Payer: COMMERCIAL

## 2021-09-02 DIAGNOSIS — R50.9: Primary | ICD-10-CM
